# Patient Record
Sex: FEMALE | Race: WHITE | Employment: UNEMPLOYED | URBAN - METROPOLITAN AREA
[De-identification: names, ages, dates, MRNs, and addresses within clinical notes are randomized per-mention and may not be internally consistent; named-entity substitution may affect disease eponyms.]

---

## 2017-11-27 PROBLEM — L03.011 CELLULITIS OF FINGER OF RIGHT HAND: Status: ACTIVE | Noted: 2017-11-27

## 2017-11-27 PROBLEM — L02.91 ABSCESS: Status: ACTIVE | Noted: 2017-11-27

## 2022-10-31 ENCOUNTER — HOSPITAL ENCOUNTER (EMERGENCY)
Age: 27
Discharge: HOME | End: 2022-10-31
Payer: COMMERCIAL

## 2022-10-31 VITALS
BODY MASS INDEX: 35.44 KG/M2 | DIASTOLIC BLOOD PRESSURE: 99 MMHG | OXYGEN SATURATION: 100 % | HEIGHT: 63 IN | WEIGHT: 200 LBS | HEART RATE: 112 BPM | RESPIRATION RATE: 16 BRPM | TEMPERATURE: 97.6 F | SYSTOLIC BLOOD PRESSURE: 136 MMHG

## 2022-10-31 DIAGNOSIS — J06.9 VIRAL URI WITH COUGH: Primary | ICD-10-CM

## 2022-10-31 LAB
INFLUENZA A: NOT DETECTED
INFLUENZA B: NOT DETECTED
SARS-COV-2 RNA, RT PCR: NOT DETECTED
STREP GRP A PCR: NEGATIVE

## 2022-10-31 PROCEDURE — 99283 EMERGENCY DEPT VISIT LOW MDM: CPT

## 2022-10-31 PROCEDURE — 6370000000 HC RX 637 (ALT 250 FOR IP): Performed by: PHYSICIAN ASSISTANT

## 2022-10-31 PROCEDURE — 87880 STREP A ASSAY W/OPTIC: CPT

## 2022-10-31 PROCEDURE — 87636 SARSCOV2 & INF A&B AMP PRB: CPT

## 2022-10-31 RX ORDER — IBUPROFEN 400 MG/1
400 TABLET ORAL ONCE
Status: COMPLETED | OUTPATIENT
Start: 2022-10-31 | End: 2022-10-31

## 2022-10-31 RX ORDER — BENZONATATE 100 MG/1
100 CAPSULE ORAL 3 TIMES DAILY PRN
Qty: 21 CAPSULE | Refills: 0 | Status: SHIPPED | OUTPATIENT
Start: 2022-10-31 | End: 2022-11-07

## 2022-10-31 RX ORDER — MONTELUKAST SODIUM 10 MG/1
10 TABLET ORAL NIGHTLY
Qty: 30 TABLET | Refills: 0 | Status: SHIPPED | OUTPATIENT
Start: 2022-10-31

## 2022-10-31 RX ORDER — DEXTROAMPHETAMINE SACCHARATE, AMPHETAMINE ASPARTATE, DEXTROAMPHETAMINE SULFATE AND AMPHETAMINE SULFATE 7.5; 7.5; 7.5; 7.5 MG/1; MG/1; MG/1; MG/1
30 TABLET ORAL 2 TIMES DAILY
COMMUNITY

## 2022-10-31 RX ADMIN — IBUPROFEN 400 MG: 400 TABLET, FILM COATED ORAL at 14:01

## 2022-10-31 ASSESSMENT — PAIN DESCRIPTION - ORIENTATION: ORIENTATION: RIGHT;LEFT

## 2022-10-31 ASSESSMENT — LIFESTYLE VARIABLES
HOW MANY STANDARD DRINKS CONTAINING ALCOHOL DO YOU HAVE ON A TYPICAL DAY: PATIENT DOES NOT DRINK
HOW OFTEN DO YOU HAVE A DRINK CONTAINING ALCOHOL: NEVER

## 2022-10-31 ASSESSMENT — PAIN DESCRIPTION - LOCATION: LOCATION: EYE

## 2022-10-31 ASSESSMENT — PAIN DESCRIPTION - DESCRIPTORS: DESCRIPTORS: ACHING;DULL;GNAWING

## 2022-10-31 ASSESSMENT — PAIN SCALES - GENERAL: PAINLEVEL_OUTOF10: 6

## 2022-10-31 ASSESSMENT — PAIN - FUNCTIONAL ASSESSMENT: PAIN_FUNCTIONAL_ASSESSMENT: NONE - DENIES PAIN

## 2022-10-31 NOTE — ED PROVIDER NOTES
[address]  Department of Emergency Medicine   ED  Encounter Note  Admit Date/RoomTime: 10/31/2022  1:41 PM  ED [facility]: [facility]    NAME: Ryan Sneed  : 1995  MRN: [id number]     Chief Complaint:  Pharyngitis (Head pressure, sore throat, cough and congestion for the last two days , ear pressure )    History of Present Illness       Ryan Sneed is a 28 y.o. old female who presents to the emergency department by private vehicle, for sore throat, sinus congestion, productive cough, bilateral ear pain x2 days. Patient states her girlfriend has similar symptoms. Patient states her symptoms are mild in severity. Patient denies anything making it better or worse. Denies fever/chills, headache, vision change, dizziness, loss taste or smell, hemoptysis, chest pain, dyspnea, abdominal pain, NVD, numbness/weakness. ROS   Pertinent positives and negatives are stated within HPI, all other systems reviewed and are negative. Past Medical History:  has no past medical history on file. Surgical History:  has no past surgical history on file. Social History:  reports that she has been smoking cigarettes. She has a 1.50 pack-year smoking history. She has never used smokeless tobacco. She reports current alcohol use. She reports current drug use. Drug: Marijuana Macclenny Spina). Family History: family history is not on file. Allergies: Patient has no known allergies. Physical Exam   Oxygen Saturation Interpretation: Normal on room air analysis. ED Triage Vitals   BP Temp Temp src Heart Rate Resp SpO2 Height Weight   10/31/22 1323 10/31/22 1323 -- 10/31/22 1323 10/31/22 1323 10/31/22 1323 10/31/22 1337 10/31/22 1337   (!) 136/99 97.6 °F (36.4 °C)  (!) 112 16 100 % 5' 3\" (1.6 m) 200 lb (90.7 kg)         Constitutional:  Alert, development consistent with age.   Ears:  External Ears: Bilateral normal.               TM's & External Canals: normal TM's and external ear canals bilaterally. Nose:   There is no discharge, swelling or lesions noted. Sinuses: no bilateral maxillary sinus tenderness. no bilateral frontal sinus tenderness. Mouth:  normal tongue and buccal mucosa. Throat: mild erythema. No exudates. Airway patent. Neck:  Supple. No meningeal signs. There is no node tenderness. Respiratory:   Breath sounds: bilateral normal.  Lung sounds: normal.   CV:  Regular rate and rhythm, normal heart sounds, without pathological murmurs, ectopy, gallops, or rubs. GI:  Abdomen Soft, nontender, good bowel sounds. No firm or pulsatile mass. Integument:  Normal turgor. Warm, dry, without visible rash. Neurological:  Oriented. Motor functions intact. Lab / Imaging Results   (All laboratory and radiology results have been personally reviewed by myself)  Labs:  Results for orders placed or performed during the hospital encounter of 10/31/22   Strep Screen Group A Throat    Specimen: Throat   Result Value Ref Range    Strep Grp A PCR Negative Negative   COVID-19 & Influenza Combo    Specimen: Nasopharyngeal Swab   Result Value Ref Range    SARS-CoV-2 RNA, RT PCR NOT DETECTED NOT DETECTED    INFLUENZA A NOT DETECTED NOT DETECTED    INFLUENZA B NOT DETECTED NOT DETECTED       Imaging: All Radiology results interpreted by Radiologist unless otherwise noted. No orders to display       ED Course / Medical Decision Making     Medications   ibuprofen (ADVIL;MOTRIN) tablet 400 mg (400 mg Oral Given 10/31/22 1401)       Consults:   None    Procedures:   None    Medical Decision Making: Patient presenting with URI symptoms. Patient is in no acute distress, afebrile, nontoxic appearance. Patient strep, COVID, influenza are negative. Discussed findings with patient and told her we recommend repeat her vitals however she eloped prior to this. Assessment     1.  Viral URI with cough      Plan   Patient eloped from emergency department before evaluation completed. .  Patient condition is stable    New Medications     Discharge Medication List as of 10/31/2022  3:15 PM        START taking these medications    Details   montelukast (SINGULAIR) 10 MG tablet Take 1 tablet by mouth nightly, Disp-30 tablet, R-0Normal      benzonatate (TESSALON PERLES) 100 MG capsule Take 1 capsule by mouth 3 times daily as needed for Cough, Disp-21 capsule, R-0Normal      Magic Mouthwash (MIRACLE MOUTHWASH) Swish and spit 5 mLs 4 times daily as needed for Irritation Preparation: 10cc maalox,  10cc Benadryl, 10cc Viscous Lidocaine, Disp-240 mL, R-0Normal           Electronically signed by Samantha Hooker PA-C   DD: 10/31/22  **This report was transcribed using voice recognition software. Every effort was made to ensure accuracy; however, inadvertent computerized transcription errors may be present.   END OF ED PROVIDER NOTE        Samantha Hooker PA-C  10/31/22 3505

## 2022-10-31 NOTE — ED NOTES
Patient left before getting discharge vitals and discharge papers.      Lacho La RN  10/31/22 1937 - - -